# Patient Record
Sex: MALE | Race: WHITE | NOT HISPANIC OR LATINO | URBAN - METROPOLITAN AREA
[De-identification: names, ages, dates, MRNs, and addresses within clinical notes are randomized per-mention and may not be internally consistent; named-entity substitution may affect disease eponyms.]

---

## 2018-12-10 ENCOUNTER — EMERGENCY (EMERGENCY)
Facility: HOSPITAL | Age: 23
LOS: 1 days | Discharge: HOME | End: 2018-12-10
Attending: EMERGENCY MEDICINE | Admitting: EMERGENCY MEDICINE
Payer: COMMERCIAL

## 2018-12-10 VITALS
WEIGHT: 149.91 LBS | RESPIRATION RATE: 16 BRPM | OXYGEN SATURATION: 100 % | SYSTOLIC BLOOD PRESSURE: 125 MMHG | DIASTOLIC BLOOD PRESSURE: 85 MMHG | HEIGHT: 70 IN | HEART RATE: 88 BPM | TEMPERATURE: 98 F

## 2018-12-10 PROCEDURE — 99282 EMERGENCY DEPT VISIT SF MDM: CPT

## 2018-12-10 PROCEDURE — 99282 EMERGENCY DEPT VISIT SF MDM: CPT | Mod: 25

## 2018-12-10 NOTE — ED ADULT TRIAGE NOTE - CHIEF COMPLAINT QUOTE
"My nose started to bleed about 30 min ago. I was coming in and out of a freezer" Pt take no blood thinners

## 2018-12-10 NOTE — ED ADULT NURSE NOTE - CHIEF COMPLAINT
This note is for the purpose of making the H&P performed in clinic within the last 30 days available in the hospital surgical encounter.   
The patient is a 23y Male complaining of nose bleed.

## 2018-12-10 NOTE — ED ADULT NURSE NOTE - OBJECTIVE STATEMENT
Pt complaining of nose bleed after been in and out of the freezer. Pt currently denies any pain or headache.

## 2018-12-10 NOTE — ED PROVIDER NOTE - OBJECTIVE STATEMENT
pt c/o spontaneous nosebleed from L nostril, moderate, for about 1 hr, started about 0015 today. no trauma. no fever, nose pain, sob,. no rhinorrhea/congestion.  no irregular bleeding/bruising

## 2018-12-10 NOTE — ED ADULT NURSE NOTE - NSIMPLEMENTINTERV_GEN_ALL_ED
Implemented All Universal Safety Interventions:  [place] to call system. Call bell, personal items and telephone within reach. Instruct patient to call for assistance. Room bathroom lighting operational. Non-slip footwear when patient is off stretcher. Physically safe environment: no spills, clutter or unnecessary equipment. Stretcher in lowest position, wheels locked, appropriate side rails in place.

## 2018-12-10 NOTE — ED PROVIDER NOTE - NOSE FINDINGS
R nasal cavity normal. L nasal cavity c small amt dried blood. no septal hematoma. no active bleeding. no lesions

## 2020-06-24 NOTE — ED PROVIDER NOTE - TEMPLATE, MLM
This note also relates to the following rows which could not be included:  Pulse - Cannot attach notes to unvalidated device data  Resp - Cannot attach notes to unvalidated device data       06/24/20 0806   PRE-TX-O2   O2 Device (Oxygen Therapy) High Flow nasal Cannula   $ Is the patient on Low Flow Oxygen? Yes   Flow (L/min) 3   SpO2 98 %   Pulse Oximetry Type Continuous   $ Pulse Oximetry - Multiple Charge Pulse Oximetry - Multiple   Preset CPAP/BiPAP Settings   $ CPAP/BiPAP Daily Charge BiPAP/CPAP Daily   Respiratory Evaluation   $ Care Plan Tech Time 15 min      General